# Patient Record
Sex: MALE | Race: WHITE
[De-identification: names, ages, dates, MRNs, and addresses within clinical notes are randomized per-mention and may not be internally consistent; named-entity substitution may affect disease eponyms.]

---

## 2018-02-02 ENCOUNTER — HOSPITAL ENCOUNTER (EMERGENCY)
Dept: HOSPITAL 62 - ER | Age: 33
Discharge: HOME | End: 2018-02-02
Payer: SELF-PAY

## 2018-02-02 VITALS — DIASTOLIC BLOOD PRESSURE: 83 MMHG | SYSTOLIC BLOOD PRESSURE: 128 MMHG

## 2018-02-02 DIAGNOSIS — R10.31: Primary | ICD-10-CM

## 2018-02-02 DIAGNOSIS — R30.0: ICD-10-CM

## 2018-02-02 DIAGNOSIS — R63.0: ICD-10-CM

## 2018-02-02 DIAGNOSIS — F17.200: ICD-10-CM

## 2018-02-02 LAB
ADD MANUAL DIFF: NO
ALBUMIN SERPL-MCNC: 4.7 G/DL (ref 3.5–5)
ALP SERPL-CCNC: 51 U/L (ref 38–126)
ALT SERPL-CCNC: 35 U/L (ref 21–72)
ANION GAP SERPL CALC-SCNC: 9 MMOL/L (ref 5–19)
APPEARANCE UR: (no result)
APTT PPP: YELLOW S
AST SERPL-CCNC: 32 U/L (ref 17–59)
BASOPHILS # BLD AUTO: 0 10^3/UL (ref 0–0.2)
BASOPHILS NFR BLD AUTO: 0.3 % (ref 0–2)
BILIRUB DIRECT SERPL-MCNC: 0.4 MG/DL (ref 0–0.4)
BILIRUB SERPL-MCNC: 0.7 MG/DL (ref 0.2–1.3)
BILIRUB UR QL STRIP: NEGATIVE
BUN SERPL-MCNC: 14 MG/DL (ref 7–20)
CALCIUM: 10 MG/DL (ref 8.4–10.2)
CHLAM PCR: NOT DETECTED
CHLORIDE SERPL-SCNC: 107 MMOL/L (ref 98–107)
CO2 SERPL-SCNC: 26 MMOL/L (ref 22–30)
EOSINOPHIL # BLD AUTO: 0.1 10^3/UL (ref 0–0.6)
EOSINOPHIL NFR BLD AUTO: 0.8 % (ref 0–6)
ERYTHROCYTE [DISTWIDTH] IN BLOOD BY AUTOMATED COUNT: 12.5 % (ref 11.5–14)
GLUCOSE SERPL-MCNC: 93 MG/DL (ref 75–110)
GLUCOSE UR STRIP-MCNC: NEGATIVE MG/DL
GON PCR: NOT DETECTED
HCT VFR BLD CALC: 43.9 % (ref 37.9–51)
HGB BLD-MCNC: 15.2 G/DL (ref 13.5–17)
KETONES UR STRIP-MCNC: NEGATIVE MG/DL
LYMPHOCYTES # BLD AUTO: 2.6 10^3/UL (ref 0.5–4.7)
LYMPHOCYTES NFR BLD AUTO: 28.9 % (ref 13–45)
MCH RBC QN AUTO: 31.1 PG (ref 27–33.4)
MCHC RBC AUTO-ENTMCNC: 34.7 G/DL (ref 32–36)
MCV RBC AUTO: 90 FL (ref 80–97)
MONOCYTES # BLD AUTO: 0.7 10^3/UL (ref 0.1–1.4)
MONOCYTES NFR BLD AUTO: 7.4 % (ref 3–13)
NEUTROPHILS # BLD AUTO: 5.7 10^3/UL (ref 1.7–8.2)
NEUTS SEG NFR BLD AUTO: 62.6 % (ref 42–78)
NITRITE UR QL STRIP: NEGATIVE
PH UR STRIP: 5 [PH] (ref 5–9)
PLATELET # BLD: 215 10^3/UL (ref 150–450)
POTASSIUM SERPL-SCNC: 4.6 MMOL/L (ref 3.6–5)
PROT SERPL-MCNC: 7 G/DL (ref 6.3–8.2)
PROT UR STRIP-MCNC: 30 MG/DL
RBC # BLD AUTO: 4.9 10^6/UL (ref 4.35–5.55)
SODIUM SERPL-SCNC: 141.7 MMOL/L (ref 137–145)
SP GR UR STRIP: 1.04
TOTAL CELLS COUNTED % (AUTO): 100 %
UROBILINOGEN UR-MCNC: 2 MG/DL (ref ?–2)
WBC # BLD AUTO: 9.1 10^3/UL (ref 4–10.5)

## 2018-02-02 PROCEDURE — 99284 EMERGENCY DEPT VISIT MOD MDM: CPT

## 2018-02-02 PROCEDURE — 96374 THER/PROPH/DIAG INJ IV PUSH: CPT

## 2018-02-02 PROCEDURE — 85025 COMPLETE CBC W/AUTO DIFF WBC: CPT

## 2018-02-02 PROCEDURE — 74177 CT ABD & PELVIS W/CONTRAST: CPT

## 2018-02-02 PROCEDURE — 96375 TX/PRO/DX INJ NEW DRUG ADDON: CPT

## 2018-02-02 PROCEDURE — 87491 CHLMYD TRACH DNA AMP PROBE: CPT

## 2018-02-02 PROCEDURE — 87086 URINE CULTURE/COLONY COUNT: CPT

## 2018-02-02 PROCEDURE — 87591 N.GONORRHOEAE DNA AMP PROB: CPT

## 2018-02-02 PROCEDURE — 96361 HYDRATE IV INFUSION ADD-ON: CPT

## 2018-02-02 PROCEDURE — 80053 COMPREHEN METABOLIC PANEL: CPT

## 2018-02-02 PROCEDURE — 81001 URINALYSIS AUTO W/SCOPE: CPT

## 2018-02-02 PROCEDURE — 36415 COLL VENOUS BLD VENIPUNCTURE: CPT

## 2018-02-02 NOTE — RADIOLOGY REPORT (SQ)
EXAM DESCRIPTION:  CT ABD/PELVIS WITH IV   ORAL



COMPLETED DATE/TIME:  2/2/2018 7:21 pm



REASON FOR STUDY:  rlq abd pain



COMPARISON:  None.



TECHNIQUE:  CT scan of the abdomen and pelvis performed with intravenous and oral contrast using aissatou
riaz scanning technique with dynamic intravenous contrast injection. Images reviewed with lung, soft t
issue, and bone windows. Reconstructed coronal and sagittal MPR images reviewed. Delayed images for e
valuation of the urinary system also acquired. All images stored on PACS.

All CT scanners at this facility use dose modulation, iterative reconstruction, and/or weight based d
osing when appropriate to reduce radiation dose to as low as reasonably achievable (ALARA).

[facility]: Dose Right  [facility]: CareDose    [facility]: Dose Right    [facility]: Teradose 4D    [facility]: Smart Technologies



CONTRAST TYPE AND DOSE:  contrast/concentration: Isovue 370.00 mg/ml; Total Contrast Delivered: 68.0 
ml; Total Saline Delivered: 65.0 ml



RENAL FUNCTION:  None required. The patient is less than 50 years old.



RADIATION DOSE:  CT Rad equipment meets quality standard of care and radiation dose reduction techniq
ues were employed. CTDIvol: 5.1 - 6.5 mGy. DLP: 574 mGy-cm. .



LIMITATIONS:  None.



FINDINGS:  LOWER CHEST: No significant findings. No nodules or infiltrates.

LIVER: Normal size. No masses.  No dilated ducts.

SPLEEN: Normal size. No focal lesions.

PANCREAS: No masses. No significant calcifications. No adjacent inflammation or peripancreatic fluid 
collections. Pancreatic duct not dilated.

GALLBLADDER: No identified stones by CT criteria. No inflammatory changes to suggest cholecystitis.

ADRENAL GLANDS: No significant masses or asymmetry.

RIGHT KIDNEY AND URETER: No solid masses.   No significant calcifications.   No hydronephrosis or hyd
roureter.  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

LEFT KIDNEY AND URETER: No solid masses.   No significant calcifications.   No hydronephrosis or hydr
oureter.  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

AORTA AND VESSELS: No aneurysm. No dissection. Renal arteries, SMA, celiac without stenosis.

RETROPERITONEUM: No retroperitoneal adenopathy, hemorrhage or masses.

BOWEL AND PERITONEAL CAVITY: No obstruction. No visualized masses. No free fluid.  No inflammatory ch
anges or thickening of bowel wall.

APPENDIX: Normal.

PELVIS: No significant masses.  Normal bladder. No free fluid.

ABDOMINAL WALL: No masses. No hernias.

BONES: No significant or acute findings.

OTHER: No other significant finding.



IMPRESSION:  NO SIGNIFICANT OR ACUTE FINDINGS IN THE ABDOMEN OR PELVIS.

INCIDENTAL NOTE MADE OF DUPLICATED RENAL COLLECTING SYSTEM BILATERALLY WITH SEPARATE URETERS.



TECHNICAL DOCUMENTATION:  JOB ID:  [id number]

Quality ID # [id number]: Final reports with documentation of one or more dose reduction techniques (e.g., Au
tomated exposure control, adjustment of the mA and/or kV according to patient size, use of iterative 
reconstruction technique)

 2011 [employer]- All Rights Reserved

## 2018-02-02 NOTE — ER DOCUMENT REPORT
ED General





- General


Chief Complaint: Abdominal Pain


Stated Complaint: STOMACH PAIN


Time Seen by Provider: 02/02/18 16:20


Notes: 





32-year-old male with pain in the right lower abdomen.  States that pain is 

been present for approximately 1 month.  Some dysuria.  Denies any discharge 

from the penis.  No fever, chills, sweats.  Does have some loss of appetite.  

That has been working a lot and has not been able to get it checked out.  

Finally decided to get something done about it today.


TRAVEL OUTSIDE OF THE [place] IN LAST 30 DAYS: No





- HPI


Onset/Duration: Gradual, Constant


Quality of pain: Dull


Severity: Moderate


Pain Level: 2





- Related Data


Allergies/Adverse Reactions: 


 





Penicillins Allergy (Verified 02/02/18 16:00)


 











Past Medical History





- General


Information source: Patient





- Social History


Smoking Status: Current Every Day Smoker


Chew tobacco use (# tins/day): No


Frequency of alcohol use: None


Drug Abuse: None


Lives with: Family


Family History: Reviewed & Not Pertinent


Patient has suicidal ideation: No


Patient has homicidal ideation: No





- Past Medical History


Cardiac Medical History: Reports: None


Endocrine Medical History: Reports: None


Renal/ Medical History: Reports: None.  Denies: Hx Peritoneal Dialysis


Malignancy Medical History: Reports None


GI Medical History: Reports: None





Review of Systems





- Review of Systems


Constitutional: denies: Fever, Malaise, Weakness


EENT: denies: Eye pain, Blurred vision, Difficulty swallowing, Throat swelling


Cardiovascular: denies: Chest pain, Palpitations, Heart racing, Orthopnea


Respiratory: denies: Cough, Hurts to breathe, Hemoptysis, Short of breath


Gastrointestinal: Abdominal pain, Nausea.  denies: Diarrhea, Vomiting


Genitourinary: Dysuria, Flank pain, Urgency.  denies: Burning, Discharge, 

Hematuria


Male Genitourinary: denies: Erectile dysfunction, Testicular pain, Penile 

discharge


Musculoskeletal: Back pain.  denies: Joint pain, Muscle pain, Muscle stiffness


Skin: denies: Dryness, Lesions, Rash


Hematologic/Lymphatic: denies: Anemia, Blood clots, Easy bleeding, Easy bruising


Neurological/Psychological: denies: Sensory change, Weakness, Headaches, 

Numbness





Physical Exam





- Vital signs


Vitals: 


 











Temp Pulse Resp BP Pulse Ox


 


 98.2 F   66   17   136/85 H  98 


 


 02/02/18 16:08  02/02/18 16:08  02/02/18 16:08  02/02/18 16:08  02/02/18 16:08











Interpretation: Normal





- General


General appearance: Appears well, Alert





- HEENT


Head: Normocephalic, Atraumatic


Eyes: Normal


Pupils: PERRL





- Respiratory


Respiratory status: No respiratory distress


Chest status: Nontender


Breath sounds: Normal


Chest palpation: Normal





- Cardiovascular


Rhythm: Regular


Heart sounds: Normal auscultation


Murmur: No





- Abdominal


Inspection: Normal


Distension: No distension


Bowel sounds: Normal


Tenderness: Other - Mild tenderness to palpation in the right lower quadrant.  

No guarding.  No rebound.  Very thin abdomen


Organomegaly: No organomegaly





- Back


Back: Normal, Nontender





- Extremities


General upper extremity: Normal inspection, Nontender, Normal color, Normal ROM

, Normal temperature


General lower extremity: Normal inspection, Nontender, Normal color, Normal ROM

, Normal temperature, Normal weight bearing.  No: Samia's sign





- Neurological


Neuro grossly intact: Yes


Cognition: Normal


Orientation: AAOx4


[place] Coma Scale Eye Opening: Spontaneous


[place] Coma Scale Verbal: Oriented


[place] Coma Scale Motor: Obeys Commands


[place]berto Coma Scale Total: 15


Speech: Normal


Motor strength normal: LUE, RUE, LLE, RLE


Sensory: Normal





- Psychological


Associated symptoms: Normal affect, Normal mood





- Skin


Skin Temperature: Warm


Skin Moisture: Dry


Skin Color: Normal





Course





- Re-evaluation


Re-evalutation: 





02/02/18 17:29


Patient has tenderness to palpation in the right lower quadrant.  Will get a 

urine and some basics on him.  Start with a CT scan and reassess.  Unlikely 

this represents a surgical abdomen at this time.


02/02/18 19:50


CT scan unremarkable.  Labs reassuring.  Discussed results with patient.  Will 

give follow-up information for general surgery.  Patient advised to return for 

worsening symptoms or concerns.





- Vital Signs


Vital signs: 


 











Temp Pulse Resp BP Pulse Ox


 


 98.2 F   66   17   136/85 H  98 


 


 02/02/18 16:08  02/02/18 16:08  02/02/18 16:08  02/02/18 16:08  02/02/18 16:08














- Laboratory


Result Diagrams: 


 02/02/18 16:50





 02/02/18 16:50


Laboratory results interpreted by me: 


 











  02/02/18





  16:33


 


Urine Protein  30 H


 


Urine Urobilinogen  2.0 H


 


Urine Ascorbic Acid  40 H














Discharge





- Discharge


Clinical Impression: 


 Right lower quadrant abdominal pain





Condition: Good


Disposition: HOME, SELF-CARE


Instructions:  Abdominal Pain ([facility])


Additional Instructions: 


Begin taking anti-inflammatories 600-800 mg of ibuprofen every 8 hours for the 

next several days.  If your pain is getting worse or no better in the next 12-

24 hours then return to the emergency department for repeat evaluation.  Please 

follow-up with your regular doctor or surgeon for further evaluation as needed.


Prescriptions: 


Ibuprofen [Motrin 800 mg Tablet] 800 mg PO Q8H PRN #30 tab


 PRN Reason: 


Forms:  Return to Work


Referrals: 


FRANCISCO QUINTERO MD [ACTIVE STAFF] - Follow up in 3-5 days

## 2019-07-08 ENCOUNTER — HOSPITAL ENCOUNTER (EMERGENCY)
Dept: HOSPITAL 62 - ER | Age: 34
LOS: 1 days | Discharge: HOME | End: 2019-07-09
Payer: SELF-PAY
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
## 2019-07-08 DIAGNOSIS — S81.812A: Primary | ICD-10-CM

## 2019-07-08 PROCEDURE — 12002 RPR S/N/AX/GEN/TRNK2.6-7.5CM: CPT

## 2019-07-08 PROCEDURE — 90715 TDAP VACCINE 7 YRS/> IM: CPT

## 2019-07-08 PROCEDURE — 73590 X-RAY EXAM OF LOWER LEG: CPT

## 2019-07-08 PROCEDURE — 90471 IMMUNIZATION ADMIN: CPT

## 2019-07-08 PROCEDURE — 99283 EMERGENCY DEPT VISIT LOW MDM: CPT

## 2019-07-08 NOTE — RADIOLOGY REPORT (SQ)
EXAM DESCRIPTION: 



XR TIBIA FIBULA 2 VIEWS



COMPLETED DATE/TME:  07/08/2019 21:49



CLINICAL HISTORY:  33 years  Male  bone tenderness laceration



COMPARISON:  None.



TECHNIQUE: LEFT tibia fibula, two views



FINDINGS:



No acute fractures or dislocations are identified.  No osseous

destructive lesions. 





IMPRESSION:



No acute fracture is identified.

## 2019-07-09 VITALS — DIASTOLIC BLOOD PRESSURE: 89 MMHG | SYSTOLIC BLOOD PRESSURE: 135 MMHG

## 2019-07-09 PROCEDURE — 0HQLXZZ REPAIR LEFT LOWER LEG SKIN, EXTERNAL APPROACH: ICD-10-PCS

## 2019-07-09 NOTE — ER DOCUMENT REPORT
ED Wound





- General


Chief Complaint: Laceration


Stated Complaint: LACERATION ON LEFT LEG


Time Seen by Provider: 07/09/19 00:10


Notes: 





Patient is a 33-year-old male that comes to the emergency department for chief 

complaint of laceration to the left lower extremity.  He states he was trying to

get a tool off of his shelf when a saw on the shelf accidentally came down and 

cut his leg as he was trying to get out of the way.  He has an open wound over 

the front of his leg just above the ankle.  He denies any other injuries.  His 

tetanus is not up-to-date.  He denies any other complaints, denies any past 

medical history.


TRAVEL OUTSIDE OF THE [place] IN LAST 30 DAYS: No





- Related Data


Allergies/Adverse Reactions: 


                                        





Penicillins Allergy (Verified 07/08/19 21:40)


   











Past Medical History





- General


Information source: Patient





- Social History


Smoking Status: Never Smoker


Drug Abuse: None


Lives with: Alone


Family History: Reviewed & Not Pertinent


Renal/ Medical History: Denies: Hx Peritoneal Dialysis





- Immunizations


Immunizations up to date: No


Hx Diphtheria, Pertussis, Tetanus Vaccination: Yes





Review of Systems





- Review of Systems


Constitutional: No symptoms reported


EENT: No symptoms reported


Cardiovascular: No symptoms reported


Respiratory: No symptoms reported


Gastrointestinal: No symptoms reported


Genitourinary: No symptoms reported


Male Genitourinary: No symptoms reported


Musculoskeletal: See HPI


Skin: See HPI


Hematologic/Lymphatic: No symptoms reported


Neurological/Psychological: No symptoms reported





Physical Exam





- Vital signs


Vitals: 


                                        











Temp Pulse Resp BP Pulse Ox


 


 99.3 F   88   20   138/108 H  97 


 


 07/08/19 21:37  07/08/19 21:37  07/08/19 21:37  07/08/19 21:37  07/08/19 21:37














- Notes


Notes: 





GENERAL: Alert, interacts well. No acute distress.


HEAD: Normocephalic, atraumatic.


EYES: Pupils equal, round, and reactive to light. Extraocular movements intact.


ENT: Oral mucosa moist, tongue midline. Oropharynx unremarkable. Airway patent. 


LUNGS: Clear to auscultation bilaterally, no wheezes, rales, or rhonchi. No 

respiratory distress.


HEART: Regular rate and rhythm. No murmur


ABDOMEN: Soft, non-tender. Non-distended. 


GENITOURINARY: Deferred


EXTREMITIES: There is a 5 cm laceration, horizontal and irregular, partial-

thickness into the subcutaneous tissue but not deeper over the left lateral 

distal lower extremity.  Normal ankle, foot, knee exams.  Normal capillary 

refill and sensation, no bruising or swelling.


BACK: no cervical, thoracic, lumbar midline tenderness. No saddle anesthesia, 

normal distal neurovascular exam. 


NEUROLOGICAL: Alert and oriented x3. Normal speech. Cranial nerves II through 

XII grossly intact. 


SKIN: Warm, dry, normal turgor. No rashes or lesions noted.





Course





- Re-evaluation


Re-evalutation: 


X-ray reviewed and unremarkable.  Wound was carefully explored, patient has no 

evidence of nerve, tendon, or large vessel injury.  Wound was cleaned 

thoroughly, closed, discussed wound care, follow-up, return precautions.  

Patient states understanding and agreement.





- Vital Signs


Vital signs: 


                                        











Temp Pulse Resp BP Pulse Ox


 


 99.0 F   78   16   135/89 H  99 


 


 07/09/19 01:15  07/09/19 01:15  07/09/19 01:15  07/09/19 01:15  07/09/19 01:15














Procedures





- Laceration/Wound Repair


  ** Left lateral distal leg


Wound length (cm): 5


Wound's Depth, Shape: Irregular


Laceration pre-procedure: Sterile PPE donned, Sterile drapes applied, Shur-Clens

applied


Anesthetic type: 1% Lidocaine w/epi


Volume Anesthetic (mLs): 7


Wound explored: Clean, No foreign body removed


Irrigated w/ Saline (mLs): 70


Wound Repaired With: Sutures


Suture Size/Type: 4:0, Nylon


Number of Sutures: 1 - running


Layer Closure?: No


Post-procedure wound care: Sterile dressing applied


Post-procedure NV exam normal: Yes


Complications: No





Discharge





- Discharge


Clinical Impression: 


Laceration of left leg


Qualifiers:


 Encounter type: initial encounter Qualified Code(s): S81.812A - Laceration 

without foreign body, left lower leg, initial encounter





Condition: Stable


Disposition: HOME, SELF-CARE


Additional Instructions: 


The laceration required suturing.  Sutures need to be removed in approximately 7

days.


Keep clean, clean with soap and water, dab dry, you can keep thin film of 

antibiotic over the area.  Avoid soaking or scrubbing.


The x-ray does not show any concerning findings.


Return for any concerning symptoms including developing pain, redness, swelling,

discolored discharge, or any other concerning or worsening symptoms.